# Patient Record
Sex: FEMALE | Race: WHITE
[De-identification: names, ages, dates, MRNs, and addresses within clinical notes are randomized per-mention and may not be internally consistent; named-entity substitution may affect disease eponyms.]

---

## 2017-03-09 ENCOUNTER — HOSPITAL ENCOUNTER (EMERGENCY)
Dept: HOSPITAL 25 - ED | Age: 67
Discharge: HOME | End: 2017-03-09
Payer: MEDICARE

## 2017-03-09 VITALS — SYSTOLIC BLOOD PRESSURE: 115 MMHG | DIASTOLIC BLOOD PRESSURE: 59 MMHG
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
## 2017-03-09 DIAGNOSIS — S90.112A: ICD-10-CM

## 2017-03-09 DIAGNOSIS — S93.402A: ICD-10-CM

## 2017-03-09 DIAGNOSIS — F17.210: ICD-10-CM
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
## 2017-03-09 DIAGNOSIS — M54.5: Primary | ICD-10-CM

## 2017-03-09 PROCEDURE — 81003 URINALYSIS AUTO W/O SCOPE: CPT

## 2017-03-09 PROCEDURE — 72100 X-RAY EXAM L-S SPINE 2/3 VWS: CPT

## 2017-03-09 PROCEDURE — 99282 EMERGENCY DEPT VISIT SF MDM: CPT

## 2017-03-09 PROCEDURE — 81015 MICROSCOPIC EXAM OF URINE: CPT

## 2017-03-09 NOTE — RAD
HISTORY: Injury, back pain



COMPARISONS: None



VIEWS: 3 , Frontal, lateral, and coned-down lateral sacral views of the lumbar spine



FINDINGS: 



ALIGNMENT:  The alignment is normal.

VERTEBRAL BODIES:  The vertebral body heights are normal. The interpedicular distances are

normal.

JOINTS:  There is diffuse facet hypertrophic change

INTERVERTEBRAL DISCS:  There is mild diffuse loss of intervertebral disc height.

SOFT TISSUE: Unremarkable.



OTHER:  The pelvis is unremarkable. The lung bases are clear.



IMPRESSION: 

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
## 2017-03-09 NOTE — RAD
Indication: Left foot pain.



2 views left foot demonstrates no fracture or dislocation. No other bone or joint

abnormality is noted.



IMPRESSION: No fracture of the left foot is noted.

## 2017-03-09 NOTE — RAD
Indication: Left ankle injury and pain.



2 views of left ankle demonstrates no fracture. Ankle mortise is intact. No other bone or

joint abnormality is noted.



IMPRESSION: No fracture of the left ankle is noted.

## 2017-11-16 ENCOUNTER — HOSPITAL ENCOUNTER (EMERGENCY)
Dept: HOSPITAL 25 - ED | Age: 67
Discharge: HOME | End: 2017-11-16
Payer: MEDICARE

## 2017-11-16 VITALS — DIASTOLIC BLOOD PRESSURE: 61 MMHG | SYSTOLIC BLOOD PRESSURE: 123 MMHG

## 2017-11-16 DIAGNOSIS — M79.671: ICD-10-CM
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
## 2017-11-16 DIAGNOSIS — S60.419A: ICD-10-CM
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
## 2017-11-16 DIAGNOSIS — F17.210: ICD-10-CM

## 2017-11-16 DIAGNOSIS — E11.9: ICD-10-CM

## 2017-11-16 DIAGNOSIS — M25.512: ICD-10-CM

## 2017-11-16 DIAGNOSIS — M25.532: ICD-10-CM

## 2017-11-16 DIAGNOSIS — S00.03XA: ICD-10-CM

## 2017-11-16 DIAGNOSIS — S63.502A: ICD-10-CM

## 2017-11-16 DIAGNOSIS — S43.402A: ICD-10-CM
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
## 2017-11-16 DIAGNOSIS — S99.921A: Primary | ICD-10-CM

## 2017-11-16 PROCEDURE — 99282 EMERGENCY DEPT VISIT SF MDM: CPT

## 2017-11-16 NOTE — RAD
INDICATION:  Right foot injury.



TECHNIQUE: 3 views of the right foot were obtained.



FINDINGS: There is a small sclerotic benign-appearing lesion present in the proximal

phalanx of the great toe. No other focal osseous abnormalities or fracture is seen.  



There is mild osteoarthritic change in the first metatarsal-phalangeal joint.

  

IMPRESSION:  NO EVIDENCE FOR FRACTURE. IF THE PATIENT'S SYMPTOMS PERSIST, RECOMMEND

FOLLOW-UP IMAGING.
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
## 2017-11-16 NOTE — RAD
INDICATION:  Left wrist injury.



TECHNIQUE: 3 views of the left wrist were obtained.



FINDINGS:  The bones are in normal alignment. No fracture is seen. Joint spaces appear

maintained.



IMPRESSION:  NO EVIDENCE FOR FRACTURE. IF THE PATIENT'S SYMPTOMS PERSIST RECOMMEND

FOLLOW-UP IMAGING.

## 2017-11-16 NOTE — RAD
HISTORY: Left shoulder pain, fall



COMPARISONS: None



VIEWS: 4, Frontal internal rotation, external rotation, outlet, and axillary views of the

left shoulder



FINDINGS:



BONE DENSITY: Normal.

BONES: There is no displaced fracture.    

JOINTS: There is no arthropathy.    

ALIGNMENT: There is no dislocation. 

SOFT TISSUES: Unremarkable.



OTHER FINDINGS: None.



IMPRESSION: 

NO ACUTE OSSEOUS INJURY. IF SYMPTOMS PERSIST, RECOMMEND REPEAT IMAGING.

## 2018-08-16 ENCOUNTER — HOSPITAL ENCOUNTER (EMERGENCY)
Dept: HOSPITAL 25 - ED | Age: 68
Discharge: HOME | End: 2018-08-16
Payer: MEDICARE

## 2018-08-16 DIAGNOSIS — K21.9: ICD-10-CM
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
## 2018-08-16 DIAGNOSIS — M54.2: Primary | ICD-10-CM

## 2018-08-16 DIAGNOSIS — S93.401A: ICD-10-CM
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
## 2018-08-16 DIAGNOSIS — Z79.82: ICD-10-CM

## 2018-08-16 DIAGNOSIS — S63.501A: ICD-10-CM

## 2018-08-16 DIAGNOSIS — M54.9: ICD-10-CM
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
## 2018-08-16 DIAGNOSIS — F17.200: ICD-10-CM

## 2018-08-16 PROCEDURE — 99282 EMERGENCY DEPT VISIT SF MDM: CPT

## 2018-08-16 PROCEDURE — 72040 X-RAY EXAM NECK SPINE 2-3 VW: CPT

## 2018-08-16 NOTE — RAD
INDICATION:  Right ankle injury.



TECHNIQUE: 3 views of the right ankle were obtained.



FINDINGS:  There is mild soft tissue swelling. The bones are in normal alignment. No

fracture is seen. Joint spaces appear maintained.



IMPRESSION:  SOFT TISSUE SWELLING, NO FRACTURE IS SEEN.

## 2018-08-16 NOTE — RAD
INDICATION:  Trauma.



COMPARISON:  Comparison is made with a prior study from October 20, 2013.



TECHNIQUE: 3 views of the cervical spine were obtained including lateral, AP and

open-mouth odontoid views.



FINDINGS: The inferior aspect of the C6 vertebral body and the C7 vertebra projects over

the shoulders and are not well visualized. There is straightening of the cervical spine.

No prevertebral soft tissue swelling or fracture is seen. 



There is moderate to severe degenerative disc disease at the C4-C5, C5-C6 and C6-C7

levels.



The results of this exam were discussed with the referring clinician.



IMPRESSION:  

1. LIMITED STUDY A PORTION OF THE C6 AND THE C7 VERTEBRA ARE OBSCURED BY THE PATIENT'S

SHOULDERS.

2. STRAIGHTENING OF THE CERVICAL SPINE NO FRACTURE IS SEEN.

3. MODERATE TO SEVERE DEGENERATIVE DISC DISEASE IN THE LOWER CERVICAL SPINE.

## 2018-08-16 NOTE — RAD
INDICATION: RIGHT wrist anatomic. Tenderness to palpation and pain post fall.



COMPARISON: August 23, 2016



TECHNIQUE: AP, lateral, and oblique views RIGHT wrist. Scaphoid view obtained.



REPORT: No cortical disruption or suspicious trabecular irregularity to suggest fracture.

Normal articular alignment. Subchondral cyst at the distal margin of the ulna without

change. Unremarkable soft tissue contours. 



IMPRESSION: 

#. No radiographic evidence for scaphoid or other wrist fracture. If there is high index

of suspicion for an occult scaphoid fracture repeat exam in 7 - 10 days would be

suggested.

## 2018-09-09 NOTE — ED
Complex/Multi-Sys Presentation





- HPI Summary


HPI Summary: 


PT SEEN 8/16/2018:





Pt here w/ multiple areas of pain s/p fall today. Reports she was going outside 

and slipped and fell. Denies striking her head and no LOC, HA, change in 

vision. She does have neck pain which was so bothersome, nursing placed a soft 

collar. Additionally from fall, she has pain in her Rt ankle radiates up shin 

which she landed oddly/twisted while falling - doesn't recall details. Has pain 

as well in her Rt wrist - may have landed here? Denies numbness, tingling, 

weakness. Has not tried anything prior to arrival. Has baseline pain in her 

lower back d/t arthritis - has norco she takes at home - this is not worse 

since fall.  








- History Of Current Complaint


Chief Complaint: EDTraumaMultiple


Time Seen by Provider: 08/16/18 09:14


Hx Obtained From: Patient





- Allergies/Home Medications


Allergies/Adverse Reactions: 


 Allergies











Allergy/AdvReac Type Severity Reaction Status Date / Time


 


No Known Allergies Allergy   Verified 08/16/18 08:59














PMH/Surg Hx/FS Hx/Imm Hx


Previously Healthy: Yes


Endocrine/Hematology History: Reports: Hx Diabetes - poorly controlled


   Denies: Hx Anticoagulant Therapy - takes ASA 81mg daily, Hx Blood Disorders


Cardiovascular History: 


   Denies: Hx Cardiac Arrest


GI History: Reports: Hx Gastroesophageal Reflux Disease - takes PPI PRN


Musculoskeletal History: Reports: Hx Arthritis - back pain - takes norco PRN


   Denies: Hx Osteoporosis


Sensory History: 


   Denies: Hx Contacts or Glasses


Opthamlomology History: 


   Denies: Hx Contacts or Glasses


Infectious Disease History: No


Infectious Disease History: 


   Denies: Traveled Outside the [place] in Last 30 Days





- Family History


Known Family History: Positive: Diabetes





- Social History


Alcohol Use: None


Substance Use Type: Reports: None


Hx Tobacco Use: Yes


Smoking Status (MU): Current Every Day Smoker





Review of Systems


Constitutional: Negative


Negative: Fatigue


Eyes: Negative


Negative: Photophobia, Blurred Vision, Diplopia


ENT: Negative


Negative: Epistaxis, Dental Pain


Cardiovascular: Negative


Negative: Chest Pain


Respiratory: Negative


Negative: Shortness Of Breath


Gastrointestinal: Negative


Negative: Abdominal Pain, Nausea


Negative: incontinence


Positive: Arthralgia, Myalgia


Positive: Bruising


Neurological: Negative


Positive: Anxious - concerned about injuries


All Other Systems Reviewed And Are Negative: Yes





Physical Exam


Triage Information Reviewed: Yes


Vital Signs On Initial Exam: 


 Initial Vitals











Temp Pulse Resp BP Pulse Ox


 


 98 F   72   18   103/55   94 


 


 08/16/18 08:55  08/16/18 08:55  08/16/18 08:55  08/16/18 08:55  08/16/18 08:55











Vital Signs Reviewed: Yes


Appearance: Positive: Well-Appearing, Well-Nourished, Pain Distress - mild to 

moderate - better w/ rest - would like to try ibuprofen


Skin: Positive: Warm, Skin Color Reflects Adequate Perfusion, Dry - superficial 

abrasions of Rt hand - no bleeding


Head/Face: Positive: Normal Head/Face Inspection - atraumatic


Eyes: Positive: Normal, EOMI, DE - no photophobia


ENT: Positive: Hearing grossly normal, Pharynx normal - atraumatic


Dental: Negative: Dental Fracture @


Neck: Positive: Supple, Tenderness @ - pt can move neck but reports TTP over 

spinous pp and paracervical mm


Respiratory/Lung Sounds: Positive: Breath Sounds Present


Cardiovascular: Positive: Pulses are Symmetrical in both Upper and Lower 

Extremities.  Negative: Leg Edema Left, Leg Edema Right


Abdomen Description: Positive: Nontender, Soft


Bowel Sounds: Positive: Present


Musculoskeletal: Positive: Strength/ROM Intact, Pain @ - Rt ankle TTP, mild 

edema, no gross deformity - can move but is sore - has been bearing weight; Rt 

wrist is TTP - FROM -no gross deformity


Neurological: Positive: Normal, Sensory/Motor Intact, Alert, Oriented to Person 

Place, Time, CN Intact II-III


Psychiatric: Positive: Anxious





Diagnostics





- Vital Signs


 Vital Signs











  Temp Pulse Resp BP Pulse Ox


 


 08/16/18 11:40  0 F  0  0  00/0  0


 


 08/16/18 10:00    23  


 


 08/16/18 09:16    13  98/62 


 


 08/16/18 09:06   74  18   95


 


 08/16/18 08:55  98 F  72  18  103/55  94














- Laboratory


Lab Statement: Any lab studies that have been ordered have been reviewed, and 

results considered in the medical decision making process.





Complex Multi-Symp Course/Dx


Course Of Treatment: XR of cervical spine is inconclusive - radiology 

requesting CT for better clarity to r/o fx - pt declines and reports she has to 

go home and take care of things. SHe is aware she could have a significant 

injury that could result in worse pain, nerve impingment and/or paralysis.  Rt 

wrist and Rt ankle XR's are w/o acute findings other than soft tissue swelling 

on ankle XR (correlate w/ clinical exam). She is moving this well and able to 

bear weight. Offered assistive walking device but pt declines. Since she is TTP 

over her wrist w/ possible FOOSH, will splint and advise f/u XR in 7-10 days to 

assess for occult fx.  Supportive care for these injuries and advised to return 

to ED if danger s/sx present. Pt agrees w/ plan.





- Diagnoses


Provider Diagnoses: 


 Fall from standing, Cervical pain, Right wrist sprain, Right ankle sprain








Discharge





- Sign-Out/Discharge


Documenting (check all that apply): Patient Departure





- Discharge Plan


Condition: Stable


Disposition: HOME


Patient Education Materials:  Ankle Sprain (ED), Wrist Injury (ED), Crutch 

Instructions (ED), Neck Pain (ED), Splint Care (ED)


Referrals: 


[facility] [Outside]


Additional Instructions: 


You were seen here today for multiple areas of pain after falling in her yard.  

Your cervical spine cannot be appropriately assessed so a clear diagnosis 

cannot be made today. You may simply have have a sprain with muscle spasm 

however you could have an occult fracture.  A CT was recommended however he 

reported he will come back to the emergency department if any danger signs or 

symptoms present or if her pain persists.


*If you develop worsening of her neck pain, headache, change in vision, numbness

, tingling, weakness into your arms, return to the emergency department.





Your wrist injury appears to be a sprain however given your mechanism of injury 

and location for pain, it is recommended she wear splint for 7-10 days and have 

repeat wrist x-ray.  If in the meantime your pain resolves, a repeat x-ray is 

not necessary.  Call your PCP to schedule. 


*If he developed numbness, tingling, skin discoloration, swelling or weakness, 

return to the emergency department





Your right ankle does not appear to be fractured and dislocated.  You appear to 

have a sprain.  You may implement rest, ice, elevation and use your crutches to 

avoid repeated weightbearing in an effort to allow this to heal.  Furthermore 

you may take ibuprofen or acetaminophen for pain.  He may also use topical 

analgesic such as Biofreeze, etc.  If pain persists beyond 1-2 weeks, follow up 

with her PCP.


*If he developed numbness, tingling, skin discoloration or weakness, return to 

the emergency department








NOTE: If you do not have a PCP, you may contact [facility] for follow-up 

appointment.  Contact information provided here.





- Billing Disposition and Condition


Condition: STABLE


Disposition: Home